# Patient Record
Sex: FEMALE | Race: OTHER
[De-identification: names, ages, dates, MRNs, and addresses within clinical notes are randomized per-mention and may not be internally consistent; named-entity substitution may affect disease eponyms.]

---

## 2018-09-23 ENCOUNTER — HOSPITAL ENCOUNTER (EMERGENCY)
Dept: HOSPITAL 72 - EMR | Age: 18
Discharge: HOME | End: 2018-09-23
Payer: SELF-PAY

## 2018-09-23 VITALS — BODY MASS INDEX: 31.89 KG/M2 | WEIGHT: 180 LBS | HEIGHT: 63 IN

## 2018-09-23 VITALS — DIASTOLIC BLOOD PRESSURE: 83 MMHG | SYSTOLIC BLOOD PRESSURE: 138 MMHG

## 2018-09-23 DIAGNOSIS — H66.92: ICD-10-CM

## 2018-09-23 DIAGNOSIS — H60.92: Primary | ICD-10-CM

## 2018-09-23 PROCEDURE — 99282 EMERGENCY DEPT VISIT SF MDM: CPT

## 2018-09-23 NOTE — EMERGENCY ROOM REPORT
History of Present Illness


General


Chief Complaint:  Earache


Source:  Patient





Present Illness


HPI


Patient presents with complaints of left ear pain for the past 3-4 days she 

reports that initially there was just a sensation of swelling


However the pain started about 2-3 days ago





Denies any fevers denies any neck pain or photophobia


She felt a small lymph node on the left lower jaw area





Patient reports about 2-3 ear infections per year





Denies any recent water contact


Denies any rash


Allergies:  


Coded Allergies:  


     No Known Allergies (Unverified , 9/23/18)





Patient History


Past Medical History:  see triage record


Pertinent Family History:  none


Last Menstrual Period:  unknown


Reviewed Nursing Documentation:  PMH: Agreed; PSxH: Agreed





Nursing Documentation-PMH


Past Medical History:  No Stated History





Review of Systems


All Other Systems:  negative except mentioned in HPI





Physical Exam





Vital Signs








  Date Time  Temp Pulse Resp B/P (MAP) Pulse Ox O2 Delivery O2 Flow Rate FiO2


 


9/23/18 08:12 98.0 87 16 138/83 97 Room Air  





 98.1       








Sp02 EP Interpretation:  reviewed, normal


General Appearance:  well appearing, no apparent distress


Head:  normocephalic, atraumatic


Eyes:  bilateral eye PERRL, bilateral eye EOMI


ENT:  other - The left ear canal appears edematous and irritated, tympanic 

membrane is erythematous and bulging the right side is clear no obvious 

drainage.  Mastoid is nontender and non-boggy


Neck:  full range of motion, supple


Respiratory:  lungs clear


Cardiovascular #1:  regular rate, rhythm, no edema


Gastrointestinal:  non tender


Musculoskeletal:  normal inspection


Neurologic:  alert, oriented x3, responsive


Skin:  normal color, no rash


Lymphatic:  no adenopathy





Medical Decision Making


Diagnostic Impression:  


 Primary Impression:  


 Otitis media


 Additional Impression:  


 Otitis externa


ER Course


Patient's findings are clinically consistent with otitis externa and media





Patient is discussed regarding avoiding Q-tip usage


Also requiring close outpatient follow-up





Patient is placed on appropriate medication for both and requires close 

outpatient follow-up





Last Vital Signs








  Date Time  Temp Pulse Resp B/P (MAP) Pulse Ox O2 Delivery O2 Flow Rate FiO2


 


9/23/18 08:12 98.0 87 16 138/83 97 Room Air  





 98.1       








Status:  unchanged


Disposition:  HOME, SELF-CARE


Condition:  Stable





Additional Instructions:  


Patient is provided with the discharge instructions notified to follow up with 

primary doctor in the next 2-3 days otherwise return to the er with any 

worsening symptoms.


Please note that this report is being documented using DRAGON technology.  This 

can lead to erroneous entry secondary to incorrect interpretation by the 

dictating instrument.











Juan Fonseca DO Sep 23, 2018 08:37